# Patient Record
Sex: FEMALE | Race: WHITE | NOT HISPANIC OR LATINO | URBAN - METROPOLITAN AREA
[De-identification: names, ages, dates, MRNs, and addresses within clinical notes are randomized per-mention and may not be internally consistent; named-entity substitution may affect disease eponyms.]

---

## 2017-01-06 ENCOUNTER — OFFICE (OUTPATIENT)
Dept: URBAN - METROPOLITAN AREA CLINIC 33 | Facility: CLINIC | Age: 57
End: 2017-01-06

## 2017-01-06 VITALS
HEART RATE: 91 BPM | HEIGHT: 65 IN | SYSTOLIC BLOOD PRESSURE: 114 MMHG | WEIGHT: 209 LBS | DIASTOLIC BLOOD PRESSURE: 88 MMHG | TEMPERATURE: 97.3 F

## 2017-01-06 DIAGNOSIS — K21.0 GASTRO-ESOPHAGEAL REFLUX DISEASE WITH ESOPHAGITIS: ICD-10-CM

## 2017-01-06 PROCEDURE — 99213 OFFICE O/P EST LOW 20 MIN: CPT

## 2017-01-06 NOTE — SERVICEHPINOTES
Mrs. Barnhart is here for a yearly medication refill. In December of 2015, she had a colonoscopy which showed internal hemorrhoids. An EGD showed an esophageal ring which was dilated and a hiatal hernia. She notes dysphagia at times with certain foods such as bananas--if she avoids these foods, no dysphagia is present. No nocturnal GERD. Normal appetite and no early satiety. No other UGI issues. Does note heartburn still at least once a week despite PPI use.